# Patient Record
Sex: FEMALE | Race: WHITE | NOT HISPANIC OR LATINO | Employment: OTHER | URBAN - METROPOLITAN AREA
[De-identification: names, ages, dates, MRNs, and addresses within clinical notes are randomized per-mention and may not be internally consistent; named-entity substitution may affect disease eponyms.]

---

## 2018-06-05 ENCOUNTER — HOSPITAL ENCOUNTER (EMERGENCY)
Facility: HOSPITAL | Age: 63
Discharge: HOME | End: 2018-06-05
Attending: EMERGENCY MEDICINE
Payer: COMMERCIAL

## 2018-06-05 VITALS
DIASTOLIC BLOOD PRESSURE: 83 MMHG | RESPIRATION RATE: 20 BRPM | SYSTOLIC BLOOD PRESSURE: 157 MMHG | WEIGHT: 133 LBS | BODY MASS INDEX: 25.11 KG/M2 | HEIGHT: 61 IN | HEART RATE: 82 BPM | OXYGEN SATURATION: 100 % | TEMPERATURE: 98 F

## 2018-06-05 DIAGNOSIS — N20.0 KIDNEY STONE: Primary | ICD-10-CM

## 2018-06-05 LAB
ALBUMIN SERPL-MCNC: 3.9 G/DL (ref 3.3–5.5)
ALP SERPL-CCNC: 82 U/L (ref 42–141)
BILIRUB SERPL-MCNC: 0.6 MG/DL (ref 0.2–1.6)
BILIRUBIN, POC UA: NEGATIVE
BLOOD, POC UA: ABNORMAL
BUN SERPL-MCNC: 23 MG/DL (ref 7–22)
CALCIUM SERPL-MCNC: 9.4 MG/DL (ref 8–10.3)
CHLORIDE SERPL-SCNC: 102 MMOL/L (ref 98–108)
CLARITY, POC UA: ABNORMAL
COLOR, POC UA: YELLOW
CREAT SERPL-MCNC: 1.1 MG/DL (ref 0.6–1.2)
GLUCOSE SERPL-MCNC: 110 MG/DL (ref 73–118)
GLUCOSE, POC UA: NEGATIVE
KETONES, POC UA: NEGATIVE
LEUKOCYTE EST, POC UA: NEGATIVE
NITRITE, POC UA: NEGATIVE
PH UR STRIP: 7 [PH]
POC ALT (SGPT): 21 U/L (ref 10–47)
POC AST (SGOT): 31 U/L (ref 11–38)
POC TCO2: 32 MMOL/L (ref 18–33)
POTASSIUM BLD-SCNC: 4 MMOL/L (ref 3.6–5.1)
PROTEIN, POC UA: NEGATIVE
PROTEIN, POC: 7.5 G/DL (ref 6.4–8.1)
SODIUM BLD-SCNC: 143 MMOL/L (ref 128–145)
SPECIFIC GRAVITY, POC UA: 1.02
UROBILINOGEN, POC UA: 0.2 E.U./DL

## 2018-06-05 PROCEDURE — 96361 HYDRATE IV INFUSION ADD-ON: CPT

## 2018-06-05 PROCEDURE — 96374 THER/PROPH/DIAG INJ IV PUSH: CPT

## 2018-06-05 PROCEDURE — 80053 COMPREHEN METABOLIC PANEL: CPT

## 2018-06-05 PROCEDURE — 99284 EMERGENCY DEPT VISIT MOD MDM: CPT | Mod: 25

## 2018-06-05 PROCEDURE — 85025 COMPLETE CBC W/AUTO DIFF WBC: CPT

## 2018-06-05 PROCEDURE — 63600175 PHARM REV CODE 636 W HCPCS: Performed by: EMERGENCY MEDICINE

## 2018-06-05 PROCEDURE — 96375 TX/PRO/DX INJ NEW DRUG ADDON: CPT | Mod: 59

## 2018-06-05 PROCEDURE — 25000003 PHARM REV CODE 250: Performed by: EMERGENCY MEDICINE

## 2018-06-05 PROCEDURE — 81003 URINALYSIS AUTO W/O SCOPE: CPT

## 2018-06-05 RX ORDER — HYDROMORPHONE HYDROCHLORIDE 1 MG/ML
1 INJECTION, SOLUTION INTRAMUSCULAR; INTRAVENOUS; SUBCUTANEOUS
Status: DISCONTINUED | OUTPATIENT
Start: 2018-06-05 | End: 2018-06-05

## 2018-06-05 RX ORDER — ONDANSETRON 2 MG/ML
4 INJECTION INTRAMUSCULAR; INTRAVENOUS
Status: COMPLETED | OUTPATIENT
Start: 2018-06-05 | End: 2018-06-05

## 2018-06-05 RX ORDER — KETOROLAC TROMETHAMINE 30 MG/ML
15 INJECTION, SOLUTION INTRAMUSCULAR; INTRAVENOUS
Status: COMPLETED | OUTPATIENT
Start: 2018-06-05 | End: 2018-06-05

## 2018-06-05 RX ORDER — HYDROCODONE BITARTRATE AND ACETAMINOPHEN 5; 325 MG/1; MG/1
1 TABLET ORAL EVERY 4 HOURS PRN
Qty: 18 TABLET | Refills: 0 | Status: SHIPPED | OUTPATIENT
Start: 2018-06-05

## 2018-06-05 RX ORDER — MORPHINE SULFATE 8 MG/ML
2 INJECTION INTRAMUSCULAR; INTRAVENOUS; SUBCUTANEOUS
Status: COMPLETED | OUTPATIENT
Start: 2018-06-05 | End: 2018-06-05

## 2018-06-05 RX ADMIN — SODIUM CHLORIDE 1000 ML: 0.9 INJECTION, SOLUTION INTRAVENOUS at 08:06

## 2018-06-05 RX ADMIN — MORPHINE SULFATE 2 MG: 8 INJECTION INTRAVENOUS at 08:06

## 2018-06-05 RX ADMIN — KETOROLAC TROMETHAMINE 15 MG: 30 INJECTION, SOLUTION INTRAMUSCULAR at 08:06

## 2018-06-05 RX ADMIN — ONDANSETRON 4 MG: 2 INJECTION INTRAMUSCULAR; INTRAVENOUS at 08:06

## 2018-06-06 NOTE — ED PROVIDER NOTES
Encounter Date: 6/5/2018    SCRIBE #1 NOTE: I, Jeff Fernandez, am scribing for, and in the presence of,  Dr. Carrillo. I have scribed the entire note.       History     Chief Complaint   Patient presents with    Back Pain     Right low back pain that radiates around right flank and into low pelvic region x 2 hours. + nausea, - vomting, - diarrhea, - constipation, - dysuria       This is a 62 y.o. female who presents with constant left back pain that radiates into her groin for 2 hours. She  describes the pain as sharp in quality. She notes associated nausea. She  denies any fever, vomiting, hematuria, or dysuria. She denies any bowel/bladder incontinence or saddle anaesthesia.        The history is provided by the patient.     Review of patient's allergies indicates:  No Known Allergies  History reviewed. No pertinent past medical history.  Past Surgical History:   Procedure Laterality Date    KIDNEY STONE SURGERY       History reviewed. No pertinent family history.  Social History   Substance Use Topics    Smoking status: Never Smoker    Smokeless tobacco: Never Used    Alcohol use No     Review of Systems   Constitutional: Negative for activity change, appetite change, chills, diaphoresis, fatigue, fever and unexpected weight change.   HENT: Negative for congestion, dental problem, drooling, ear discharge, ear pain, facial swelling, hearing loss, mouth sores, nosebleeds, postnasal drip, rhinorrhea, sinus pain, sinus pressure, sneezing, sore throat, tinnitus, trouble swallowing and voice change.    Eyes: Negative for photophobia, pain, discharge, redness, itching and visual disturbance.   Cardiovascular: Negative for chest pain, palpitations and leg swelling.   Gastrointestinal: Positive for abdominal pain and nausea. Negative for abdominal distention, anal bleeding, blood in stool, constipation, diarrhea, rectal pain and vomiting.   Genitourinary: Negative for decreased urine volume, difficulty urinating,  dysuria, enuresis, flank pain, frequency, genital sores, hematuria and urgency.   Musculoskeletal: Positive for back pain. Negative for arthralgias, gait problem, joint swelling, myalgias, neck pain and neck stiffness.   Skin: Negative for color change, pallor, rash and wound.   Neurological: Negative for dizziness, tremors, seizures, syncope, facial asymmetry, speech difficulty, weakness, light-headedness, numbness and headaches.   Hematological: Negative for adenopathy. Does not bruise/bleed easily.   Psychiatric/Behavioral: Negative for agitation, behavioral problems, confusion, decreased concentration, dysphoric mood, hallucinations, self-injury, sleep disturbance and suicidal ideas. The patient is not nervous/anxious and is not hyperactive.    All other systems reviewed and are negative.      Physical Exam     Initial Vitals [06/05/18 2018]   BP Pulse Resp Temp SpO2   (!) 157/82 107 20 98.2 °F (36.8 °C) 99 %      MAP       107         Physical Exam    Nursing note and vitals reviewed.  Constitutional: Vital signs are normal. She appears well-developed and well-nourished. She is not diaphoretic. She is active and cooperative. No distress.   HENT:   Head: Normocephalic and atraumatic.   Eyes: Conjunctivae, EOM and lids are normal. Pupils are equal, round, and reactive to light. Right eye exhibits no discharge. Left eye exhibits no discharge. No scleral icterus.   Neck: Trachea normal, normal range of motion and full passive range of motion without pain. Neck supple. No thyroid mass and no thyromegaly present. No stridor present. No tracheal tenderness present. No tracheal deviation present. No JVD present.   Cardiovascular: Normal rate, regular rhythm, S1 normal, S2 normal, normal heart sounds, intact distal pulses and normal pulses.   Pulmonary/Chest: Effort normal and breath sounds normal.   Abdominal: Soft. Normal appearance, normal aorta and bowel sounds are normal. She exhibits no distension and no mass.  There is no tenderness. There is no rebound and no guarding.       Musculoskeletal: Normal range of motion.   Lymphadenopathy:     She has no axillary adenopathy.   Neurological: She is alert and oriented to person, place, and time. She has normal strength. No cranial nerve deficit or sensory deficit.   Skin: Skin is warm, dry and intact. Capillary refill takes less than 2 seconds. No rash noted. No erythema. No pallor.   Psychiatric: She has a normal mood and affect. Her speech is normal and behavior is normal. Judgment and thought content normal. Cognition and memory are normal.         ED Course   Procedures  Labs Reviewed   POCT URINALYSIS W/O SCOPE - Abnormal; Notable for the following:        Result Value    Glucose, UA Negative (*)     Bilirubin, UA Negative (*)     Ketones, UA Negative (*)     Blood, UA 2+ (*)     Protein, UA Negative (*)     Nitrite, UA Negative (*)     Leukocytes, UA Negative (*)     All other components within normal limits   POCT CMP - Abnormal; Notable for the following:     POC BUN 23 (*)     All other components within normal limits   POCT URINALYSIS W/O SCOPE   POCT CBC   POCT URINALYSIS DIPSTICK (CULTURE IF INDICATED)   POCT CMP             Medical Decision Making:   Clinical Tests:   Lab Tests: Ordered and Reviewed  Radiological Study: Ordered and Reviewed  ED Management:  Pain free            Scribe Attestation:   Scribe #1: I performed the above scribed service and the documentation accurately describes the services I performed. I attest to the accuracy of the note.          Results for orders placed or performed during the hospital encounter of 06/05/18   POCT URINALYSIS W/O SCOPE   Result Value Ref Range    Glucose, UA Negative (NG)     Bilirubin, UA Negative (NG)     Ketones, UA Negative (NG)     Spec Grav UA 1.025     Blood, UA 2+ (A)     PH, UA 7.0     Protein, UA Negative (NG)     Urobilinogen, UA 0.2 E.U./dL    Nitrite, UA Negative (NG)     Leukocytes, UA Negative (NG)      Color, UA Yellow     Clarity, UA Slightly Cloudy    POCT CMP   Result Value Ref Range    Albumin, POC 3.9 3.3 - 5.5 g/dL    Alkaline Phosphatase, POC 82 42 - 141 U/L    ALT (SGPT), POC 21 10 - 47 U/L    AST (SGOT), POC 31 11 - 38 U/L    POC BUN 23 (H) 7 - 22 mg/dL    Calcium, POC 9.4 8.0 - 10.3 mg/dL    POC Chloride 102 98 - 108 mmol/L    POC Creatinine 1.1 0.6 - 1.2 mg/dL    POC Glucose 110 73 - 118 mg/dL    POC Potassium 4.0 3.6 - 5.1 mmol/L    POC Sodium 143 128 - 145 mmol/L    Bilirubin 0.6 0.2 - 1.6 mg/dL    POC TCO2 32 18 - 33 mmol/L    Protein 7.5 6.4 - 8.1 g/dL           Imaging Results          CT Renal Stone Study ABD Pelvis WO (Final result)  Result time 06/05/18 21:11:20    Final result by Nakia Pritchett MD (06/05/18 21:11:20)                 Impression:      Distal right ureteral stone measuring 5-6 mm resulting in moderate right-sided hydronephrosis.    Additional bilateral nonobstructing renal stones.      Electronically signed by: Nakia Pritchett MD  Date:    06/05/2018  Time:    21:11             Narrative:    EXAMINATION:  CT RENAL STONE STUDY ABD PELVIS WO    CLINICAL HISTORY:  Flank pain, stone disease suspected;    TECHNIQUE:  Low dose axial images, sagittal and coronal reformations were obtained from the lung bases to the pubic symphysis.  Contrast was not administered.    COMPARISON:  None    FINDINGS:  The visualized portion of the heart is unremarkable.  The lung bases are clear.    No focal hepatic abnormality seen on this noncontrast exam.  There is no intra-or extrahepatic biliary ductal dilatation.  The gallbladder is unremarkable.  The stomach, pancreas, spleen, and adrenal glands are unremarkable.    There is obstructing 5-6 mm stone visualized within the distal right ureter resulting in moderate right-sided hydronephrosis.  Additional bilateral nonobstructing renal stones are seen.  No stones are seen along the left ureteral course.  Urinary bladder is nondistended.  Uterus is  unremarkable.  Small left renal hypodensity, probable cyst is noted.    Appendix is visualized and is unremarkable.  The visualized loops of small and large bowel show no evidence of obstruction or inflammation.  No free air or free fluid.    Aorta tapers normally.    Facet arthropathy is visualized resulting in grade 1 anterolisthesis of L4 on L5.  No acute osseous abnormality identified.  Subcutaneous soft tissue structures are unremarkable.                                   Clinical Impression:     1. Kidney stone         CT Renal Stone Study ABD Pelvis WO   Final Result      Distal right ureteral stone measuring 5-6 mm resulting in moderate right-sided hydronephrosis.      Additional bilateral nonobstructing renal stones.         Electronically signed by: Nakia Pritchett MD   Date:    06/05/2018   Time:    21:11                                 Isac Carrillo MD  06/05/18 [id number]